# Patient Record
Sex: MALE | Race: WHITE | NOT HISPANIC OR LATINO | Employment: OTHER | URBAN - METROPOLITAN AREA
[De-identification: names, ages, dates, MRNs, and addresses within clinical notes are randomized per-mention and may not be internally consistent; named-entity substitution may affect disease eponyms.]

---

## 2017-01-27 ENCOUNTER — GENERIC CONVERSION - ENCOUNTER (OUTPATIENT)
Dept: OTHER | Facility: OTHER | Age: 82
End: 2017-01-27

## 2017-03-09 ENCOUNTER — ALLSCRIPTS OFFICE VISIT (OUTPATIENT)
Dept: OTHER | Facility: OTHER | Age: 82
End: 2017-03-09

## 2017-03-24 ENCOUNTER — GENERIC CONVERSION - ENCOUNTER (OUTPATIENT)
Dept: OTHER | Facility: OTHER | Age: 82
End: 2017-03-24

## 2017-04-07 ENCOUNTER — GENERIC CONVERSION - ENCOUNTER (OUTPATIENT)
Dept: OTHER | Facility: OTHER | Age: 82
End: 2017-04-07

## 2017-05-22 ENCOUNTER — GENERIC CONVERSION - ENCOUNTER (OUTPATIENT)
Dept: OTHER | Facility: OTHER | Age: 82
End: 2017-05-22

## 2017-06-13 ENCOUNTER — GENERIC CONVERSION - ENCOUNTER (OUTPATIENT)
Dept: OTHER | Facility: OTHER | Age: 82
End: 2017-06-13

## 2017-06-14 ENCOUNTER — GENERIC CONVERSION - ENCOUNTER (OUTPATIENT)
Dept: OTHER | Facility: OTHER | Age: 82
End: 2017-06-14

## 2017-07-24 ENCOUNTER — ALLSCRIPTS OFFICE VISIT (OUTPATIENT)
Dept: OTHER | Facility: OTHER | Age: 82
End: 2017-07-24

## 2017-07-24 LAB
BILIRUB UR QL STRIP: NEGATIVE
CLARITY UR: NORMAL
COLOR UR: YELLOW
GLUCOSE (HISTORICAL): NEGATIVE
HGB UR QL STRIP.AUTO: NORMAL
KETONES UR STRIP-MCNC: NEGATIVE MG/DL
LEUKOCYTE ESTERASE UR QL STRIP: NEGATIVE
NITRITE UR QL STRIP: NEGATIVE
PH UR STRIP.AUTO: 5.5 [PH]
PROT UR STRIP-MCNC: 100 MG/DL
SP GR UR STRIP.AUTO: 1.01
UROBILINOGEN UR QL STRIP.AUTO: 0.2

## 2017-09-14 ENCOUNTER — ALLSCRIPTS OFFICE VISIT (OUTPATIENT)
Dept: OTHER | Facility: OTHER | Age: 82
End: 2017-09-14

## 2017-09-29 ENCOUNTER — GENERIC CONVERSION - ENCOUNTER (OUTPATIENT)
Dept: OTHER | Facility: OTHER | Age: 82
End: 2017-09-29

## 2017-11-28 ENCOUNTER — GENERIC CONVERSION - ENCOUNTER (OUTPATIENT)
Dept: FAMILY MEDICINE CLINIC | Facility: CLINIC | Age: 82
End: 2017-11-28

## 2017-12-11 ENCOUNTER — GENERIC CONVERSION - ENCOUNTER (OUTPATIENT)
Dept: FAMILY MEDICINE CLINIC | Facility: CLINIC | Age: 82
End: 2017-12-11

## 2018-01-10 NOTE — PROGRESS NOTES
Assessment    1  Chronic kidney disease (585 9) (N18 9)   2  Hypothyroidism (244 9) (E03 9)   3  Atrial fibrillation (427 31) (I48 91)   4  Parkinson's disease (332 0) (G20)   5  Bipolar disorder (296 80) (F31 9)    Discussion/Summary    He is in today for his regular 6 mo, I had last seen him in September  He is complaining of decreased hearing left ear, I was able to remove wax from right but not left, I would like him to use Debrox wax softener in left ear for one or 2 days  his TSH in October was fine at 2 76, A1c 6 1 at that time  He did undergo CBC, CMP via neurology, Dr Sami Rodríguez, in January, this was stable with hemoglobin 10 2, creatinine 1 72  He will continue to see neurology, psychiatry and he will also be following up with his cardiologist   heart rate was initially elevated, dropped after sitting  Oxygen saturation 87% without increased shortness of breath  Runs lower blood pressures/ orthostatic in past - hold off on adding other meds  Fortunately he has had no falls, use walker as is  Recheck here 6 mo - call sooner as needed  Chief Complaint  Blocked ears / reg check      History of Present Illness  HPI: Mayank Jones is in today with his wife, notes blockage left ear  Otherwise is feeling about the same, ongoing tremors with depression for which she sees specialist, no increased chest pain or shortness of breath, sees cardiology every 6 months or so  No recent falls      Review of Systems    Constitutional: Appetite same, but as noted in HPI, no fever and no chills  ENT: Few recent minor nosebleeds  Cardiovascular: Occasional palpitations, but the heart rate was not slow, no chest pain, the heart rate was not fast and no lower extremity edema  Respiratory: No increased shortness of breath, but no cough, no orthopnea and no wheezing  Gastrointestinal: No change in bowel, but no abdominal pain, no nausea, no vomiting and no blood in stools  Genitourinary: no dysuria     Integumentary: Is seeing dermatology and needs removal superficial cancer, but no skin wound  Neurological: Sees neurology, no increase lightheadedness, no new weakness, but no headache and no fainting  Active Problems    1  Anemia (285 9) (D64 9)   2  Atrial fibrillation (427 31) (I48 91)   3  Benign prostatic hypertrophy (600 00) (N40 0)   4  Bipolar disorder (296 80) (F31 9)   5  History of CABG   6  Chronic kidney disease (585 9) (N18 9)   7  Chronic Obstructive Pulmonary Disease   8  Coronary arteriosclerosis (414 00) (I25 10)   9  Gastroesophageal reflux disease (530 81) (K21 9)   10  History of falling (V15 88) (Z91 81)   11  Hyperglycemia (790 29) (R73 9)   12  Hypothyroidism (244 9) (E03 9)   13  Memory Lapses Or Loss (780 93)   14  Muscle weakness of lower extremity (728 87) (M62 81)   15  Orthostatic hypotension (458 0) (I95 1)   16  Osteoarthritis (715 90) (M19 90)   17  Overactive bladder (596 51) (N32 81)   18  Parkinson's disease (332 0) (G20)   19  Restless Legs Syndrome    Past Medical History    1  History of Fracture of radius, proximal, right, closed (813 07) (S52 101A)   2  Gastroesophageal reflux disease (530 81) (K21 9)   3  History of laceration of skin (V15 59) (Z87 828)   4  History of Multiple skin tears (879 8) (T14 8)   5  History of Septicemia, Geriatric Presentation (038 8)  Active Problems And Past Medical History Reviewed: The active problems and past medical history were reviewed and updated today  Social History    · Former smoker (V15 82) (S40 267)   · Marital History - Currently    · Stopped Drinking Alcohol  The social history was reviewed and updated today  Surgical History    1  History of CABG   2  History of Hernia Repair   3  History of Knee Replacement   4  History of Tonsillectomy    Current Meds   1  Aspirin 81 MG TABS; TAKE 1 TABLET Mon/ Weds/Fri; Last Rx:12Mar2015 Ordered   2  Escitalopram Oxalate 10 MG Oral Tablet; take 2 tablet daily;    Therapy: 04CKX3154 to (Evaluate:62Pmo6834); Last Rx:12Mar2015 Ordered   3  LamoTRIgine 200 MG Oral Tablet; TAKE 1 TABLET DAILY; Therapy: (Recorded:06Jun2012) to Recorded   4  Levothyroxine Sodium 75 MCG Oral Tablet; Take 1 tablet daily; Therapy: 88JPN4463 to (Evaluate:73Yhb1206)  Requested for: 90Pqm2936; Last   Rx:12Sep2016 Ordered   5  LORazepam 0 5 MG Oral Tablet; TAKE 1 TAB THREE TIMES A DAY as need for anxiety; Last Rx:12Mar2015 Ordered   6  Pantoprazole Sodium 40 MG Oral Tablet Delayed Release; TAKE 1 TABLET BY MOUTH   EVERY DAY FOR STOMACH ACID; Therapy: 83SVZ4796 to (Eliana Sanabria)  Requested for: 08Aug2016; Last   Rx:08Aug2016 Ordered   7  Rivastigmine Tartrate 3 MG Oral Capsule; TAKE 1 CAPSULE TWICE DAILY; Therapy: 59MPZ5405 to (Evaluate:17Mar2016); Last Rx:16Mar2016 Ordered   8  Simvastatin 20 MG Oral Tablet; 1 qd; Therapy: (Recorded:06Jun2012) to Recorded   9  Sinemet  MG Oral Tablet; TAKE 1 TABLET 4 TIMES DAILY; Therapy: (Recorded:08Jun2012) to Recorded   10  Tamsulosin HCl - 0 4 MG Oral Capsule; take 1 capsule by mouth daily; Therapy: 71Tzj7471 to (Evaluate:02Apr2017)  Requested for: 04Oct2016; Last    Rx:04Oct2016 Ordered   11  Toviaz 4 MG Oral Tablet Extended Release 24 Hour; Take 1 tablet daily; Therapy: (Recorded:16Mar2016) to Recorded   12  Vitamin D 1000 UNIT CAPS; take 1 capsule daily; Therapy: (Recorded:16Mar2016) to Recorded    The medication list was reviewed and updated today  Allergies    1  Colchicine Powder    Vitals   Recorded: 70HQS7042 10:22AM Recorded: 24DVN0399 10:15AM   Temperature  97 7 F   Heart Rate 88 102   Pulse Quality Regular    Systolic  685   Diastolic  62   Height  5 ft 8 in   Weight  133 lb    BMI Calculated  20 22   BSA Calculated  1 72   O2 Saturation  87, RA     Physical Exam    Constitutional More animated here today, pleasant very tremulous male arises from chair and seat self on table with minor cyst  Appears is at baseline     Eyes   Conjunctiva and lids: No swelling, erythema, or discharge  No pallor or icterus  Ears, Nose, Mouth, and Throat Wax occluding left canal, unable to remove  Pulmonary   Auscultation of lungs: Clear to auscultation, equal breath sounds bilaterally, no wheezes, no rales, no rhonci  Cardiovascular Irregular, irregular a controlled rate  No ankle edema  Abdomen   Abdomen: Non-tender, no masses  Lymphatic   Palpation of lymph nodes in neck: No lymphadenopathy  Neurologic Alert and oriented Ã3, moderately severe tremor at rest head, limits     Psychiatric   Orientation to person, place and time: Normal          Future Appointments    Date/Time Provider Specialty Site   09/14/2017 10:00 AM Mitch Grady DO Family Medicine [address] [facility]     Signatures   Electronically signed by : Mitch Giles DO; Mar  9 2017 12:38PM EST                       (Author)

## 2018-01-10 NOTE — MISCELLANEOUS
Provider Comments  Provider Comments:   Patient no showed appt on 9/14/17      Signatures   Electronically signed by : Yayo Finch, ; Sep 14 2017 11:06AM EST                       (Author)

## 2018-01-13 VITALS
SYSTOLIC BLOOD PRESSURE: 116 MMHG | OXYGEN SATURATION: 87 % | HEIGHT: 68 IN | TEMPERATURE: 97.7 F | WEIGHT: 133 LBS | DIASTOLIC BLOOD PRESSURE: 62 MMHG | HEART RATE: 88 BPM | BODY MASS INDEX: 20.16 KG/M2

## 2018-01-14 VITALS
BODY MASS INDEX: 21.05 KG/M2 | HEIGHT: 66 IN | DIASTOLIC BLOOD PRESSURE: 74 MMHG | WEIGHT: 131 LBS | SYSTOLIC BLOOD PRESSURE: 122 MMHG

## 2018-01-15 NOTE — MISCELLANEOUS
Message   Recorded as Task   Date: 09/29/2017 10:08 AM, Created By: Hyacinth Funez   Task Name: Call Back   Assigned To: Tucker ENRIQUE,TEAM   Regarding Patient: Thompson Jovel, Status: Active   Comment:    Alexsandra Solitario - 29 Sep 2017 10:08 AM     TASK CREATED  Caller: Self; [phone] (Home)  Patients wife Yolanda Patel calling in regard to his medication  The new medication is not working well for him and he would like to go back on [place]  Please call her   ElaineLatasha - 29 Sep 2017 11:38 AM     TASK EDITED  SPOKE TO WIFE, PT REQUESTING TOVIAZ 4MG ONE MONTH SUPPLY TO BE SENT TO PHARM  WILL DIRECT TO DR Melanie Ortiz FOR ORDER  PER DR SKELTON REORDER TOVIAZ 4MG DAILY  [address] TO PHARM WIFE NOTIFIED  1        1 Amended By: Larry Mota; Sep 29 2017 2:51 PM EST    Active Problems   1  Anemia (285 9) (D64 9)  2  Atrial fibrillation (427 31) (I48 91)  3  Benign prostatic hypertrophy (600 00) (N40 0)  4  Bipolar disorder (296 80) (F31 9)  5  History of CABG  6  Chronic kidney disease (585 9) (N18 9)  7  Chronic Obstructive Pulmonary Disease  8  Coronary arteriosclerosis (414 00) (I25 10)  9  Gastroesophageal reflux disease (530 81) (K21 9)  10  History of falling (V15 88) (Z91 81)  11  Hyperglycemia (790 29) (R73 9)  12  Hypothyroidism (244 9) (E03 9)  13  Memory Lapses Or Loss (780 93)  14  Muscle weakness of lower extremity (728 87) (M62 81)  15  Orthostatic hypotension (458 0) (I95 1)  16  Overactive bladder (596 51) (N32 81)  17  Parkinson's disease (332 0) (G20)  18  Restless Legs Syndrome    Current Meds  1  Aspirin 81 MG TABS; TAKE 1 TABLET Mon/ Weds/Fri; Last Rx:12Mar2015 Ordered  2  Escitalopram Oxalate 10 MG Oral Tablet; take 2 tablet daily; Therapy: 46PTI3958 to (Evaluate:60Icm2238); Last Rx:12Mar2015 Ordered  3  LamoTRIgine 200 MG Oral Tablet; TAKE 1 TABLET DAILY; Therapy: (Recorded:06Jun2012) to Recorded  4   Levothyroxine Sodium 75 MCG Oral Tablet; take 1 tablet by mouth daily; Therapy: 16YGS5515 to (Evaluate:20Oct2017)  Requested for: 20OYW0561; Last   Rx:20Sep2017 Ordered  5  LORazepam 0 5 MG Oral Tablet; TAKE 1 TAB THREE TIMES A DAY as need for anxiety; Last Rx:12Mar2015 Ordered  6  Pantoprazole Sodium 40 MG Oral Tablet Delayed Release; TAKE 1 TABLET BY MOUTH   EVERY DAY FOR STOMACH ACID; Therapy: 14RON5611 to (Evaluate:02Lum2666)  Requested for: 68OEJ5136; Last   Rx:53Kty9983 Ordered  7  Rivastigmine Tartrate 3 MG Oral Capsule (Exelon); TAKE 1 CAPSULE TWICE DAILY; Therapy: 55IOQ9073 to (Evaluate:17Mar2016); Last Rx:16Mar2016 Ordered  8  Simvastatin 20 MG Oral Tablet; 1 qd; Therapy: (Recorded:06Jun2012) to Recorded  9  Sinemet  MG Oral Tablet (Carbidopa-Levodopa); TAKE 1 TABLET 4 TIMES   DAILY; Therapy: (Recorded:08Jun2012) to Recorded  10  Tolterodine Tartrate ER 4 MG Oral Capsule Extended Release 24 Hour; take 1 capsule    daily; Therapy: 27XRF0914 to (21 )  Requested for: 17MFB4865; Last    Rx:67Edb1708 Ordered  11  Vitamin D 1000 UNIT CAPS; take 1 capsule daily; Therapy: (Recorded:16Mar2016) to Recorded    Allergies   1   Colchicine Powder    Signatures   Electronically signed by : Katharina Ji RN; Sep 29 2017  2:51PM EST                       (Author)

## 2018-01-16 NOTE — MISCELLANEOUS
Message   Recorded as Task   Date: 05/20/2016 11:17 AM, Created By: Emiliano Mcgraw   Task Name: Medical Complaint Callback   Assigned To: Missael Rodrigues   Regarding Patient: Hubert Li, Status: Active   Comment:    Emiliano Mcgraw - 20 May 2016 11:17 AM     TASK CREATED  Caller: Self; Medical Complaint; [phone] (Home)  Pt called and l/m stating that he has been having nose bleeds off and on for last 2 weeks and now nose is bleeding again  Pt then called office and instructed him to go to ED now for an eval and cauterization of bleed     Barbara Pantoja - 20 May 2016 11:17 AM     TASK REASSIGNED: Previously Assigned To Antolin Oropeza - 20 May 2016 11:33 AM     TASK REPLIED TO: Previously Assigned To Missael Rodrigues  noted        Signatures   Electronically signed by : Harvey Aguila DO; May 20 2016 11:33AM EST                       (Author)

## 2018-02-06 ENCOUNTER — OFFICE VISIT (OUTPATIENT)
Dept: UROLOGY | Facility: MEDICAL CENTER | Age: 83
End: 2018-02-06
Payer: MEDICARE

## 2018-02-06 VITALS
DIASTOLIC BLOOD PRESSURE: 74 MMHG | HEIGHT: 66 IN | WEIGHT: 133.6 LBS | BODY MASS INDEX: 21.47 KG/M2 | SYSTOLIC BLOOD PRESSURE: 116 MMHG

## 2018-02-06 DIAGNOSIS — N40.1 BENIGN PROSTATIC HYPERPLASIA (BPH) WITH URINARY URGENCY: Primary | ICD-10-CM

## 2018-02-06 DIAGNOSIS — R39.15 BENIGN PROSTATIC HYPERPLASIA (BPH) WITH URINARY URGENCY: Primary | ICD-10-CM

## 2018-02-06 LAB
SL AMB  POCT GLUCOSE, UA: NEGATIVE
SL AMB LEUKOCYTE ESTERASE,UA: NEGATIVE
SL AMB POCT BILIRUBIN,UA: NEGATIVE
SL AMB POCT BLOOD,UA: ABNORMAL
SL AMB POCT CLARITY,UA: CLEAR
SL AMB POCT COLOR,UA: YELLOW
SL AMB POCT KETONES,UA: NEGATIVE
SL AMB POCT NITRITE,UA: NEGATIVE
SL AMB POCT PH,UA: 5
SL AMB POCT SPECIFIC GRAVITY,UA: 1.01
SL AMB POCT URINE PROTEIN: ABNORMAL
SL AMB POCT UROBILINOGEN: 0.2

## 2018-02-06 PROCEDURE — 81003 URINALYSIS AUTO W/O SCOPE: CPT | Performed by: UROLOGY

## 2018-02-06 PROCEDURE — 99214 OFFICE O/P EST MOD 30 MIN: CPT | Performed by: UROLOGY

## 2018-02-06 RX ORDER — ESCITALOPRAM OXALATE 10 MG/1
2 TABLET ORAL DAILY
COMMUNITY
Start: 2014-07-10

## 2018-02-06 RX ORDER — PANTOPRAZOLE SODIUM 40 MG/1
1 TABLET, DELAYED RELEASE ORAL EVERY MORNING
COMMUNITY
Start: 2013-05-21 | End: 2018-07-13 | Stop reason: SDUPTHER

## 2018-02-06 RX ORDER — FESOTERODINE FUMARATE 4 MG/1
4 TABLET, EXTENDED RELEASE ORAL DAILY
Qty: 90 TABLET | Refills: 3 | Status: SHIPPED | OUTPATIENT
Start: 2018-02-06 | End: 2018-10-29 | Stop reason: SDUPTHER

## 2018-02-06 NOTE — PROGRESS NOTES
Assessment/Plan:    Assessment:  1  Prostatic hyperplasia without obstruction based on previous cystoscopy  2  Overactive bladder due to  3  Parkinson's disease  4  Family counseling    Plan:  1  Continue Toviaz 4 mg daily  2   Return in 1 year  No problem-specific Assessment & Plan notes found for this encounter  Diagnoses and all orders for this visit:    Benign prostatic hyperplasia (BPH) with urinary urgency  -     POCT urine dip auto non-scope    Other orders  -     pantoprazole (PROTONIX) 40 mg tablet; Take by mouth  -     MULTIPLE VITAMIN PO; Take 1 tablet by mouth  -     escitalopram (LEXAPRO) 10 mg tablet; Take 2 tablets by mouth daily          Subjective:      Patient ID: Dayday Brown is a 80 y o  male  The patient is accompanied by his wife  HPI        BPH:  The patient notes no change in his voiding symptoms, however his AUA score is up about 6 points  Quality of life is unchanged  The patient denies hematuria, bleeding or dysuria  Most of his urinary complaints are related to his overactive bladder and Parkinson's disease  A previous cystoscopy showed no prostatic obstruction and a minimal postvoid residual, indications at the problem is related to the bladder not the prostate  OAB due to Parkinson's:        Toviaz 4 mg seems to work the best for him  He has a rare accidents at night  Ordinarily, he wakes to use a bedside commode  Prior to [address] these were common place  Trials of Myrbetriq and oxybutynin proved ineffective  His Parkinson's disease in general has been getting worse for a long time  He notes problems with memory now also  The following portions of the patient's history were reviewed and updated as appropriate: allergies, current medications, past family history, past medical history, past social history, past surgical history and problem list     Review of Systems   Constitutional: Negative for activity change and fatigue     Respiratory: Negative for shortness of breath and wheezing  Cardiovascular: Negative for chest pain  Gastrointestinal: Negative for abdominal pain  Genitourinary: Negative for difficulty urinating, dysuria, frequency, hematuria and urgency  Musculoskeletal: Negative for back pain and gait problem  Skin: Negative  Allergic/Immunologic: Negative  Neurological: Negative  Psychiatric/Behavioral: Negative  Objective:     Physical Exam   Constitutional: He is oriented to person, place, and time  He appears well-developed and well-nourished  HENT:   Head: Normocephalic and atraumatic  Neck: Normal range of motion  Neck supple  Pulmonary/Chest: Effort normal    Genitourinary:   Genitourinary Comments: On digital rectal exam, the prostate is approximately 30-40 g  It is nontender  Anatomical landmarks are well defined and the texture is normal   Anal sphincter tone is normal    Musculoskeletal: Normal range of motion  Neurological: He is alert and oriented to person, place, and time  He has normal reflexes  Skin: Skin is warm and dry  Psychiatric: He has a normal mood and affect   His behavior is normal  Judgment and thought content normal

## 2018-02-06 NOTE — PATIENT INSTRUCTIONS
Overactive Bladder   AMBULATORY CARE:   Overactive bladder  is a sudden urge to urinate that is difficult for you to control  It occurs when the muscles of the bladder contract (tighten) more than normal  This causes a frequent or sudden need to urinate  You usually have to urinate more than 8 times in 24 hours  You may need to get up more than once in the middle of the night to urinate  You may also leak urine before you are able to make it to the bathroom  Contact your healthcare provider for any of the following:   · Pink or bloody urine    · Painful urination    · Continued symptoms even after treatment    · Questions or concerns about your condition or care  Manage your symptoms:   · Limit liquids as directed  Limit liquids to decrease the amount you urinate  Ask how much liquid to drink each day and which liquids are best for you  You may need to avoid drinking liquids several hours before you go to sleep  Your healthcare provider may also recommend that you limit caffeine and alcohol  · Exercise regularly and maintain a healthy weight  Ask your healthcare provider how much you should weigh and about the best exercise plan for you  Extra weight puts pressure on your bladder and may make your symptoms worse  Ask him to help you create a weight loss plan if you are overweight  · Do pelvic muscle exercises often  Your pelvic muscles help you stop urinating  Squeeze these muscles tightly for 5 seconds, then relax for 5 seconds  Gradually work up to squeezing for 10 seconds  Do 3 sets of 15 repetitions a day, or as directed  This will help strengthen your pelvic muscles and improve bladder control  · Train your bladder  Go to the bathroom at set times, such as every 2 hours, even if you do not feel the urge to go  You can also try to hold your urine when you feel the urge to go  For example, hold your urine for 5 minutes when you feel the urge to go   As that becomes easier, hold your urine for 10 minutes  Work up to every 3 or 4 hours to help control your bladder  Treatment for overactive bladder  may be needed if other methods are not working:  · Medicines  may be given to relax your bladder and decrease urination  · Sacral nerve stimulation  sends electrical signals to your sacral nerve through a small device implanted under your skin  Your sacral nerve controls your bladder, sphincter, and pelvic floor muscles  · Surgery  may be done if all other treatments cannot help you control your bladder  Follow up with your healthcare provider as directed:  Write down your questions so you remember to ask them during your visits  © 2017 [facility] Information is for End User's use only and may not be sold, redistributed or otherwise used for commercial purposes  All illustrations and images included in CareNotes® are the copyrighted property of [employer]  or [employer]  The above information is an  only  It is not intended as medical advice for individual conditions or treatments  Talk to your doctor, nurse or pharmacist before following any medical regimen to see if it is safe and effective for you

## 2018-02-06 NOTE — LETTER
February 6, 2018     Elise Pozo DO  [address] [address] [facility]     Patient: Michelle Lees   YOB: 1930   Date of Visit: 2/6/2018       Dear Dr Micheline Winston: Thank you for referring Mary Giang to me for evaluation  Below are my notes for this consultation  If you have questions, please do not hesitate to call me  I look forward to following your patient along with you  Sincerely,        Estrellita Mcgowan MD        CC: No Recipients  Estrellita Mcgowan MD  2/6/2018  2:09 PM  Sign at close encounter  Assessment/Plan:    No problem-specific Assessment & Plan notes found for this encounter  Diagnoses and all orders for this visit:    Benign prostatic hyperplasia (BPH) with urinary urgency  -     POCT urine dip auto non-scope    Other orders  -     pantoprazole (PROTONIX) 40 mg tablet; Take by mouth  -     MULTIPLE VITAMIN PO; Take 1 tablet by mouth  -     escitalopram (LEXAPRO) 10 mg tablet; Take 2 tablets by mouth daily          Subjective:      Patient ID: Michelle Lees is a 80 y o  male  HPI        BPH:  The patient notes no change in his voiding symptoms, however his AUA score is up about 6 points  Quality of life is unchanged  The patient denies hematuria, bleeding or dysuria  Most of his urinary complaints are related to his overactive bladder and Parkinson's disease  A previous cystoscopy showed no prostatic obstruction and a minimal postvoid residual, indications at the problem is related to the bladder not the prostate  OAB due to Parkinson's:        Toviaz 4 mg seems to work the best for him  He has a rare accidents at night  Ordinarily, he wakes to use a bedside commode  Prior to [employer] these were common place  Trials of Myrbetriq and oxybutynin proved ineffective  His Parkinson's disease in general has been getting worse for a long time  He notes problems with memory now also      The following portions of the patient's history were reviewed and updated as appropriate: allergies, current medications, past family history, past medical history, past social history, past surgical history and problem list     Review of Systems   Constitutional: Negative for activity change and fatigue  Respiratory: Negative for shortness of breath and wheezing  Cardiovascular: Negative for chest pain  Gastrointestinal: Negative for abdominal pain  Genitourinary: Negative for difficulty urinating, dysuria, frequency, hematuria and urgency  Musculoskeletal: Negative for back pain and gait problem  Skin: Negative  Allergic/Immunologic: Negative  Neurological: Negative  Psychiatric/Behavioral: Negative  Objective:     Physical Exam   Constitutional: He is oriented to person, place, and time  He appears well-developed and well-nourished  HENT:   Head: Normocephalic and atraumatic  Neck: Normal range of motion  Neck supple  Pulmonary/Chest: Effort normal    Genitourinary:   Genitourinary Comments: On digital rectal exam, the prostate is approximately 30-40 g  It is nontender  Anatomical landmarks are well defined and the texture is normal   Anal sphincter tone is normal    Musculoskeletal: Normal range of motion  Neurological: He is alert and oriented to person, place, and time  He has normal reflexes  Skin: Skin is warm and dry  Psychiatric: He has a normal mood and affect   His behavior is normal  Judgment and thought content normal

## 2018-02-06 NOTE — PROGRESS NOTES
IPSS Questionnaire (AUA-7): Over the past month    1)  How often have you had a sensation of not emptying your bladder completely after you finish urinating? 2 - Less than half the time   2)  How often have you had to urinate again less than two hours after you finished urinating? 5 - Almost always   3)  How often have you found you stopped and started again several times when you urinated? 4 - More than half the time   4) How difficult have you found it to postpone urination? 3 - About half the time   5) How often have you had a weak urinary stream?  5 - Almost always   6) How often have you had to push or strain to begin urination? 3 - About half the time   7) How many times did you most typically get up to urinate from the time you went to bed until the time you got up in the morning?   2 - 2 times   Total Score:  24

## 2018-02-15 DIAGNOSIS — N40.1 BENIGN LOCALIZED HYPERPLASIA OF PROSTATE WITH URINARY OBSTRUCTION AND LOWER URINARY TRACT SYMPTOMS: Primary | ICD-10-CM

## 2018-02-15 DIAGNOSIS — N13.9 BENIGN LOCALIZED HYPERPLASIA OF PROSTATE WITH URINARY OBSTRUCTION AND LOWER URINARY TRACT SYMPTOMS: Primary | ICD-10-CM

## 2018-02-16 RX ORDER — TAMSULOSIN HYDROCHLORIDE 0.4 MG/1
CAPSULE ORAL
Qty: 90 CAPSULE | Refills: 0 | OUTPATIENT
Start: 2018-02-16

## 2018-02-19 RX ORDER — TAMSULOSIN HYDROCHLORIDE 0.4 MG/1
0.4 CAPSULE ORAL
Qty: 90 CAPSULE | Refills: 3 | Status: SHIPPED | OUTPATIENT
Start: 2018-02-19 | End: 2018-04-03 | Stop reason: ALTCHOICE

## 2018-03-27 ENCOUNTER — TELEPHONE (OUTPATIENT)
Dept: FAMILY MEDICINE CLINIC | Facility: CLINIC | Age: 83
End: 2018-03-27

## 2018-03-27 NOTE — TELEPHONE ENCOUNTER
Pt's wife called stating pt had a nose bleed that started around 8am and they have not been able to stop the bleeding  From the description, it sounds that it is slowing down but just will not stop completely  Pt has had a nose bleed before that required cauterization  After speaking with Dr Stuart Haque, I contacted [facility] (has seen pt for epistaxis in the past) and they are able to get him in for an appt this afternoon  Pt notified  Will contact pt tomorrow to see how he is doing and to schedule appt in the office as he missed his last appt

## 2018-03-29 NOTE — TELEPHONE ENCOUNTER
I called pt to check on him - he did go to appt at [facility] on Tuesday and they ended up cauterizing  Pt has not had any problems since  Pt does want to schedule appt but between the two of them they have a lot of appts in the next couple weeks and asked if they can call back to schedule

## 2018-04-03 ENCOUNTER — OFFICE VISIT (OUTPATIENT)
Dept: FAMILY MEDICINE CLINIC | Facility: CLINIC | Age: 83
End: 2018-04-03
Payer: MEDICARE

## 2018-04-03 VITALS
SYSTOLIC BLOOD PRESSURE: 168 MMHG | WEIGHT: 131.2 LBS | HEIGHT: 66 IN | DIASTOLIC BLOOD PRESSURE: 64 MMHG | BODY MASS INDEX: 21.08 KG/M2 | TEMPERATURE: 96.5 F

## 2018-04-03 DIAGNOSIS — G20 PARKINSON'S DISEASE (HCC): ICD-10-CM

## 2018-04-03 DIAGNOSIS — E03.9 ACQUIRED HYPOTHYROIDISM: ICD-10-CM

## 2018-04-03 DIAGNOSIS — S16.1XXA ACUTE STRAIN OF NECK MUSCLE, INITIAL ENCOUNTER: Primary | ICD-10-CM

## 2018-04-03 DIAGNOSIS — R26.9 GAIT ABNORMALITY: ICD-10-CM

## 2018-04-03 DIAGNOSIS — D64.9 ANEMIA, UNSPECIFIED TYPE: ICD-10-CM

## 2018-04-03 DIAGNOSIS — N18.30 CHRONIC KIDNEY DISEASE, STAGE III (MODERATE) (HCC): ICD-10-CM

## 2018-04-03 PROBLEM — R04.0 EPISTAXIS: Status: ACTIVE | Noted: 2018-04-03

## 2018-04-03 PROCEDURE — 99214 OFFICE O/P EST MOD 30 MIN: CPT | Performed by: FAMILY MEDICINE

## 2018-04-03 RX ORDER — LEVOTHYROXINE SODIUM 0.07 MG/1
1 TABLET ORAL DAILY
Refills: 3 | COMMUNITY
Start: 2018-03-19

## 2018-04-03 RX ORDER — ASPIRIN 81 MG/1
81 TABLET ORAL DAILY
COMMUNITY

## 2018-04-03 RX ORDER — LORAZEPAM 0.5 MG/1
0.5 TABLET ORAL DAILY PRN
Qty: 30 TABLET | Refills: 0
Start: 2018-04-03 | End: 2018-06-26

## 2018-04-03 RX ORDER — LAMOTRIGINE 200 MG/1
200 TABLET, ORALLY DISINTEGRATING ORAL EVERY MORNING
Refills: 0
Start: 2018-04-03 | End: 2018-05-21

## 2018-04-03 NOTE — PATIENT INSTRUCTIONS
He has acute cervical strain / upper thoracic strain associated with a fall in his home the other day, we do need to be very cautious with any medication, he can use Aleve 1 daily x3 days, he does have chronic kidney disease, last creatinine in December at 1 92  Hemoglobin 10 3  He also can use Tylenol as needed  Use heat or ice as needed  Consider therapy  He had seen Nephrology last yr, I gave slip to redo blood work  Also include TSH as he has not had a TSH level in over 1 year  He will continue to see Cardiology as is, he had seen them back in December-  Is not on anticoagulation due to frequent falls, also had another episode of epistaxis recently which required evaluation at ENT  Has excess wax left ear canal, use Debrox for a few days, return for flush if needed  continues to see Neurology, Dr Alex Davies, I updated med list here today    Also continues to see Psychiatry, Dr Patsy Hagan

## 2018-04-03 NOTE — PROGRESS NOTES
FAMILY PRACTICE OFFICE VISIT  Jorge WOODY  [address] [facility]  [address], [facility]      NAME: John Yañez  AGE: 80 y o  SEX: male  : 1930   MRN: [id number]    DATE: 4/3/2018  TIME: 5:52 PM    Assessment and Plan     Problem List Items Addressed This Visit        Endocrine    Hypothyroidism    Relevant Medications    levothyroxine 75 mcg tablet    Other Relevant Orders    TSH, 3rd generation       Nervous and Auditory    Parkinson's disease ([address] )    Relevant Medications    LORazepam (ATIVAN) 0 5 mg tablet    lamoTRIgine (LaMICtal) 200 MG TBDP       Genitourinary    Chronic kidney disease, stage III (moderate)    Relevant Orders    Basic metabolic panel       Other    Anemia    Relevant Orders    CBC    Gait abnormality      Other Visit Diagnoses     Acute strain of neck muscle, initial encounter    -  Primary    Relevant Orders    XR spine cervical 2 or 3 vw injury    XR spine thoracic 3 vw          Patient Instructions     He has acute cervical strain / upper thoracic strain associated with a fall in his home the other day, we do need to be very cautious with any medication, he can use Aleve 1 daily x3 days, he does have chronic kidney disease, last creatinine in December at 1 92  Hemoglobin 10 3  He also can use Tylenol as needed  Use heat or ice as needed  Consider therapy  He had seen Nephrology last yr, I gave slip to redo blood work  Also include TSH as he has not had a TSH level in over 1 year  He will continue to see Cardiology as is, he had seen them back in December-  Is not on anticoagulation due to frequent falls, also had another episode of epistaxis recently which required evaluation at ENT  Has excess wax left ear canal, use Debrox for a few days, return for flush if needed  continues to see Neurology, Dr Veronica Maldonado, I updated med list here today    Also continues to see Psychiatry,   Gross      Chief Complaint     Chief Complaint   Patient presents with    Fall     4/2/18 in am - pain in neck and down left side of his back        History of Present Illness   John Yañez is a 80y o -year-old male who he is in today accompanied by his wife, I have not seen him since March of last year  The other day at home he had lost his balance and fell into [place] cabinet, contusion with laceration left arm, also struck head, no loss of consciousness, awoke today with significant bilateral posterior neck pain, nonradicular  No change in mental status  As before has significant parkinsonism, frequent falls, continues to see Neurology, Psychiatry  Also sees Cardiology, no recent chest pain or other issues  He is using medication as directed  Regarding atrial fibrillation history he is not anticoagulated due to frequent falls along with occasional nose bleeds, he did see ENT recently with nose bleed  He also sees Urology, had seen Nephrology via [facility] last year      Review of Systems   Review of Systems   Constitutional: Positive for fatigue (No increased fatigue)  Negative for activity change (Sedentary, uses walker to ambulate), appetite change and unexpected weight change  HENT: Negative for congestion, mouth sores and sore throat  Respiratory: Negative for cough, choking, chest tightness and shortness of breath  Cardiovascular: Negative for chest pain, palpitations and leg swelling  Gastrointestinal: Negative for abdominal pain, blood in stool, nausea and vomiting  Genitourinary: Negative for dysuria and hematuria  Musculoskeletal: Positive for arthralgias, back pain, gait problem, neck pain and neck stiffness  Skin: Positive for wound (Chronic significant bruising on arms as before)  Neurological: Positive for light-headedness and headaches  Negative for syncope  Hematological: Bruises/bleeds easily     Psychiatric/Behavioral: Positive for behavioral problems (Depression has been stable)  Active Problem List     Patient Active Problem List   Diagnosis    Anemia    Nonrheumatic aortic valve stenosis    Atrial fibrillation (HCC)    Benign prostatic hyperplasia    Bipolar disorder ([address] )    Carotid disease, bilateral ([address] )    Chronic kidney disease, stage III (moderate)    Other emphysema (HCC)    Coronary arteriosclerosis    Benign essential hypertension    Gastroesophageal reflux disease    Hyperglycemia    Hyperlipidemia    Hypothyroidism    Memory loss    Muscle weakness of lower extremity    Orthostatic hypotension    Overactive bladder    Parkinson's disease ([address] )    Restless legs syndrome    Gait abnormality    Epistaxis       Past Medical History:  Past Medical History:   Diagnosis Date    Arthritis     BPH (benign prostatic hyperplasia)     Cardiac disease     Depression     External hemorrhoids     Frequent urination     Gout     Heart disease     Hx of CABG     Hyperlipidemia     Nocturia     Parkinson disease (HCC)     SOB (shortness of breath)     Squamous cell carcinoma     Staph infection     Urgency of urination        Past Surgical History:  Past Surgical History:   Procedure Laterality Date    CORONARY ARTERY BYPASS GRAFT      FOOT SURGERY Left 2006    REPLACEMENT TOTAL KNEE BILATERAL      L- 2003, R- 2006       Family History:  Family History   Problem Relation Age of Onset    Lung cancer Mother     Heart disease Father        Social History:  Social History     Social History    Marital status: /Civil Union     Spouse name: N/A    Number of children: N/A    Years of education: N/A     Occupational History    Not on file       Social History Main Topics    Smoking status: Former Smoker     Types: Cigarettes     Quit date: 1972    Smokeless tobacco: Never Used    Alcohol use Yes    Drug use: No    Sexual activity: Not on file     Other Topics Concern    Not on file     Social History Narrative    No narrative on file       Objective     Vitals:    04/03/18 1438   BP: 168/64   BP Location: Left arm   Patient Position: Sitting   Cuff Size: Standard   Temp: (!) 96 5 °F (35 8 °C)   Weight: 59 5 kg (131 lb 3 2 oz)   Height: 5' 6" (1 676 m)     Body mass index is 21 18 kg/m²  BP Readings from Last 3 Encounters:   04/03/18 168/64   02/06/18 116/74   07/24/17 122/74       Wt Readings from Last 3 Encounters:   04/03/18 59 5 kg (131 lb 3 2 oz)   02/06/18 60 6 kg (133 lb 9 6 oz)   07/24/17 59 4 kg (131 lb)       Physical Exam   Constitutional:   Pleasant 80year-old seated in chair, parkinsonian features but conversant, no acute distress  HENT:   Left ear with moderate amount nonocclusive wax   Eyes: Conjunctivae are normal  No scleral icterus  Cardiovascular:   Irregularly irregular at controlled rate   Pulmonary/Chest: Effort normal and breath sounds normal  No respiratory distress  Abdominal: There is no tenderness  Musculoskeletal:   Slight trace pitting bilateral ankles    Has bandage left elbow, I did not remove that, friable skin on arms, multiple bruises as usual on arms  Significant tenderness bilateral posterior neck/cervical, no compressive symptoms, no point tenderness vertebral, moderate decreased range of motion  Shoulder range of motion appears adequate  He is alert, oriented, appears as at baseline regarding mental status  Lymphadenopathy:     He has no cervical adenopathy  Psychiatric: He has a normal mood and affect         Pertinent Laboratory/Diagnostic Studies:    Lab Results   Component Value Date    WBC 6 22 01/15/2015    HGB 10 8 (L) 01/15/2015    HCT 33 5 (L) 01/15/2015     (H) 01/15/2015    CHOL 118 06/14/2014    TRIG 104 06/14/2014    HDL 34 06/14/2014    ALT 13 01/16/2015    AST 19 01/16/2015     01/17/2015    K 4 2 01/17/2015     01/17/2015    CREATININE 1 16 01/17/2015    BUN 22 01/17/2015    CO2 25 01/17/2015    INR 1 10 01/15/2015 Lab Results   Component Value Date    LDLCALC 63 06/14/2014     No results found for: TSH    ALLERGIES:  Allergies   Allergen Reactions    Colchicine      Reaction Date: [id number];        Current Medications     Current Outpatient Prescriptions   Medication Sig Dispense Refill    aspirin (ECOTRIN LOW STRENGTH) 81 mg EC tablet Take 81 mg by mouth daily      carbidopa-levodopa (SINEMET)  mg per tablet Take 1 tablet by mouth 4 (four) times a day       Cholecalciferol (VITAMIN D PO) Take 1,000 mg by mouth daily with lunch   escitalopram (LEXAPRO) 10 mg tablet Take 2 tablets by mouth daily      Fesoterodine Fumarate ER 4 MG TB24 Take 1 tablet (4 mg total) by mouth daily 90 tablet 3    lamoTRIgine (LaMICtal) 200 MG TBDP Take 1 tablet (200 mg total) by mouth every morning  0    levothyroxine 75 mcg tablet 1 tablet daily  3    MULTIPLE VITAMIN PO Take 1 tablet by mouth      pantoprazole (PROTONIX) 40 mg tablet Take 1 tablet by mouth every morning       rivastigmine (EXELON) 3 mg capsule Take 3 mg by mouth 2 (two) times a day   simvastatin (ZOCOR) 20 mg tablet Take 20 mg by mouth daily with dinner   LORazepam (ATIVAN) 0 5 mg tablet Take 1 tablet (0 5 mg total) by mouth daily as needed for anxiety 30 tablet 0     No current facility-administered medications for this visit            Health Maintenance     Orders Placed This Encounter   Procedures    XR spine cervical 2 or 3 vw injury    XR spine thoracic 3 vw    TSH, 3rd generation    Basic metabolic panel    CBC         Taqueria Paz DO

## 2018-05-21 ENCOUNTER — OFFICE VISIT (OUTPATIENT)
Dept: FAMILY MEDICINE CLINIC | Facility: CLINIC | Age: 83
End: 2018-05-21
Payer: MEDICARE

## 2018-05-21 VITALS
HEIGHT: 66 IN | SYSTOLIC BLOOD PRESSURE: 150 MMHG | RESPIRATION RATE: 20 BRPM | TEMPERATURE: 97 F | WEIGHT: 128.6 LBS | DIASTOLIC BLOOD PRESSURE: 78 MMHG | HEART RATE: 72 BPM | BODY MASS INDEX: 20.67 KG/M2

## 2018-05-21 DIAGNOSIS — M20.42 HAMMER TOE OF LEFT FOOT: ICD-10-CM

## 2018-05-21 DIAGNOSIS — L03.032 CELLULITIS OF FOURTH TOE, LEFT: Primary | ICD-10-CM

## 2018-05-21 DIAGNOSIS — L60.8 TOENAIL DEFORMITY: ICD-10-CM

## 2018-05-21 PROCEDURE — 99214 OFFICE O/P EST MOD 30 MIN: CPT | Performed by: FAMILY MEDICINE

## 2018-05-21 RX ORDER — ESCITALOPRAM OXALATE 20 MG/1
TABLET ORAL
Refills: 3 | COMMUNITY
Start: 2018-04-09 | End: 2018-05-21

## 2018-05-21 RX ORDER — LAMOTRIGINE 200 MG/1
TABLET ORAL
Refills: 2 | COMMUNITY
Start: 2018-04-24

## 2018-05-21 RX ORDER — TAMSULOSIN HYDROCHLORIDE 0.4 MG/1
CAPSULE ORAL
Refills: 3 | COMMUNITY
Start: 2018-05-18

## 2018-05-21 RX ORDER — CEPHALEXIN 500 MG/1
500 CAPSULE ORAL EVERY 6 HOURS SCHEDULED
Qty: 28 CAPSULE | Refills: 0 | Status: SHIPPED | OUTPATIENT
Start: 2018-05-21 | End: 2018-05-28

## 2018-05-21 NOTE — PROGRESS NOTES
Assessment/Plan:         Diagnoses and all orders for this visit:    Cellulitis of fourth toe, left    Hammer toe of left foot    Toenail deformity      The patient is here today with 4 days of pain swelling and redness of his left 4th and 5th toes, he appears to have developed some cellulitis, possibly posttraumatic although he does not remember a specific trauma  I will start him on Keflex 500 mg 4 times daily and Bactroban as well  I would like for him to follow up with the podiatrist as he does have some toenail deformity and he also has for hammer toes on that foot, I wonder if foot where might be a contributing factor for the patient  If he is getting worse, developing fevers or chills, or the redness is spreading considerably I would like to see him back in the office immediately for re-evaluation and we may need to consider imaging studies as there is a small risk of developing osteomyelitis from cellulitic injuries of the foot  I encouraged the patient to examine his feet daily, and keep them covered and wear shoes while he is able to tolerate them  Subjective:      Patient ID: Magdi Cook is a 80 y o  male  59-year-old patient who is here today complaining of 4 days of pain swelling and redness of his left 4th and 5th digits  He does not believe that he had any injury to this area but it is red, swollen, slightly warm  He has some scabbing over it and his family member who is here with him reports that there was some blood  He does walk with a walker  He has hammertoe on that side, he wonders if this might contribute because he finds that she use are not generally comfortable  He has no systemic signs of infection, no fevers or chills, no chest pain or shortness of breath that is new or changing  He thinks the redness is getting a little bit worse, he finds the swelling is getting a little bit worse, today he is wearing open-toed sandals to try to accommodate for this    He does not regularly see Podiatry but he does have some toenail deformities as well  The following portions of the patient's history were reviewed and updated as appropriate: allergies, current medications, past family history, past medical history, past social history, past surgical history and problem list     Review of Systems   Constitutional: Positive for activity change  Negative for appetite change, chills, diaphoresis, fatigue and fever  Respiratory: Negative for cough, shortness of breath and wheezing  Cardiovascular: Negative for chest pain and palpitations  Musculoskeletal: Positive for arthralgias and joint swelling  Skin: Positive for color change and wound  Hematological: Negative for adenopathy  Bruises/bleeds easily  All other systems reviewed and are negative  Objective:      /78   Pulse 72   Temp (!) 97 °F (36 1 °C)   Resp 20   Ht 5' 6" (1 676 m)   Wt 58 3 kg (128 lb 9 6 oz)   BMI 20 76 kg/m²          Physical Exam   Constitutional: He is oriented to person, place, and time  Chronically ill, appears stated age, no acute distress and nontoxic appearing   HENT:   Head: Normocephalic and atraumatic  Eyes: Conjunctivae are normal  No scleral icterus  Cardiovascular: Normal heart sounds  Pulmonary/Chest: Effort normal and breath sounds normal    Abdominal: Bowel sounds are normal  He exhibits no distension  Musculoskeletal:   Left 4th toe with scabbing on the IP, erythema and diffusely swollen with some redness spreading up towards the foot  Mild erythema and toenail deformity with scabbing on the left 5th toe  Mild warmth with palpation, swelling is nonpitting  No streaking  Ambulating with rolling walker   Neurological: He is alert and oriented to person, place, and time  PD, tremor noted at baseline   Skin: Skin is warm and dry  [facility]   Psychiatric: He has a normal mood and affect   His behavior is normal

## 2018-05-21 NOTE — PATIENT INSTRUCTIONS
Cellulitis of fourth toe, left    Hammer toe of left foot    Toenail deformity      The patient is here today with 4 days of pain swelling and redness of his left 4th and 5th toes, he appears to have developed some cellulitis, possibly posttraumatic although he does not remember a specific trauma  I will start him on Keflex 500 mg 4 times daily and Bactroban as well  I would like for him to follow up with the podiatrist as he does have some toenail deformity and he also has for hammer toes on that foot, I wonder if foot where might be a contributing factor for the patient  If he is getting worse, developing fevers or chills, or the redness is spreading considerably I would like to see him back in the office immediately for re-evaluation and we may need to consider imaging studies as there is a small risk of developing osteomyelitis from cellulitic injuries of the foot  I encouraged the patient to examine his feet daily, and keep them covered and wear shoes while he is able to tolerate them

## 2018-06-26 ENCOUNTER — OFFICE VISIT (OUTPATIENT)
Dept: NEUROLOGY | Facility: CLINIC | Age: 83
End: 2018-06-26
Payer: MEDICARE

## 2018-06-26 VITALS
BODY MASS INDEX: 18.96 KG/M2 | HEIGHT: 69 IN | SYSTOLIC BLOOD PRESSURE: 146 MMHG | WEIGHT: 128 LBS | DIASTOLIC BLOOD PRESSURE: 67 MMHG | RESPIRATION RATE: 16 BRPM | HEART RATE: 66 BPM

## 2018-06-26 DIAGNOSIS — G20 PARKINSON'S DISEASE (HCC): Primary | ICD-10-CM

## 2018-06-26 DIAGNOSIS — G31.84 MCI (MILD COGNITIVE IMPAIRMENT) WITH MEMORY LOSS: ICD-10-CM

## 2018-06-26 PROCEDURE — 99213 OFFICE O/P EST LOW 20 MIN: CPT | Performed by: PSYCHIATRY & NEUROLOGY

## 2018-06-26 RX ORDER — LORAZEPAM 0.5 MG/1
TABLET ORAL 3 TIMES DAILY
COMMUNITY
Start: 2014-02-14

## 2018-06-26 RX ORDER — LEVOTHYROXINE SODIUM 13 UG/1
CAPSULE ORAL
COMMUNITY
Start: 2014-02-14 | End: 2018-06-26

## 2018-06-26 RX ORDER — LAMOTRIGINE 200 MG/1
TABLET ORAL EVERY 12 HOURS
COMMUNITY
Start: 2014-02-14 | End: 2018-06-26

## 2018-06-26 RX ORDER — TAMSULOSIN HYDROCHLORIDE 0.4 MG/1
CAPSULE ORAL EVERY 24 HOURS
COMMUNITY
Start: 2014-02-14 | End: 2018-06-26

## 2018-06-26 RX ORDER — FESOTERODINE FUMARATE 4 MG/1
8 TABLET, EXTENDED RELEASE ORAL
COMMUNITY
Start: 2014-02-14 | End: 2018-06-26

## 2018-06-26 RX ORDER — MIDODRINE HYDROCHLORIDE 2.5 MG/1
TABLET ORAL 3 TIMES DAILY
COMMUNITY
Start: 2014-02-14

## 2018-06-26 RX ORDER — RIVASTIGMINE TARTRATE 3 MG/1
CAPSULE ORAL EVERY 12 HOURS
COMMUNITY
Start: 2017-07-05 | End: 2018-06-26

## 2018-06-26 RX ORDER — CARBIDOPA/LEVODOPA 25MG-250MG
TABLET ORAL
COMMUNITY
Start: 2018-05-31 | End: 2018-06-26

## 2018-06-26 RX ORDER — PANTOPRAZOLE SODIUM 40 MG/1
TABLET, DELAYED RELEASE ORAL EVERY 24 HOURS
COMMUNITY
Start: 2014-02-14 | End: 2018-06-26

## 2018-06-26 RX ORDER — SIMVASTATIN 20 MG
TABLET ORAL EVERY 24 HOURS
COMMUNITY
Start: 2014-02-14 | End: 2018-06-26

## 2018-06-26 RX ORDER — ESCITALOPRAM OXALATE 20 MG/1
TABLET ORAL EVERY 24 HOURS
COMMUNITY
Start: 2014-02-14 | End: 2018-06-26

## 2018-06-26 NOTE — LETTER
June 26, 2018     Marisabel Grimes DO  [address] [facility]  [address] [place] [address]    Patient: Jose Morin   YOB: 1930   Date of Visit: 6/26/2018       Dear Dr Munguia: Thank you for referring Lyssa Villar to me for evaluation  Below are my notes for this consultation  If you have questions, please do not hesitate to call me  I look forward to following your patient along with you  Sincerely,        Son Marie MD        CC: No Recipients  Son Marie MD  6/26/2018 12:29 PM  Sign at close encounter  Patient is here today for a follow up for his Parkinsons    Patient ID: Jose Morin is a 80 y o  male  Assessment/Plan:    Parkinson's disease ([address] )  Presently doing very well on his current carbidopa/levodopa dosing schedule  To continue carbidopa/levodopa 25/251 p o  q i d  (given at 6:00 a m , 10:00 a m , 2:00 p m  and 6:00 p m  daily)  MCI (mild cognitive impairment) with memory loss  Both historically and by testing with no significant decline since his last appointment here some 6 months ago  Again, the history and testing suggest that his memory issues are more subcortical in origin and represent retrieval issues rather than problems with memory formation  Likely represents a combination of the FX from Parkinson's disease and a small vessel cerebrovascular component  He will follow up in 6 months  Subjective:    HPI  Patient, now 80years of age, presents today with his wife  He is followed for his Parkinson's disease, along with some associated symptomatic cognitive issues  His Parkinson's disease dates back over a decade symptomatic  He has, however, done fairly well as of late on his carbidopa/levodopa schedule of 25/101 q i d  taken at 4:00 a m  intervals  He has had some associated tremor but no significant issues with tremor  He has had no evolving hallucinatory difficulties    He has had no falls and utilizes a walker for ambulatory security  With regard to his memory and cognitive issues, he has, according to his wife, not demonstrated any further significant decline since last seen  His memory issues are felt to represent more a problem with retrieval of that information, as he frequently in time or prompts  His problem is felt to reflect the affects of longstanding Parkinson's disease with additional contribution from small vessel cerebrovascular disease  Patient no longer operating a motor vehicle nor does he have an active 's license  Recent blood work, done elsewhere, reviewed  CBC with hemoglobin 8 6, hematocrit 25 6, white count 4 5 and platelet count 372  Creatinine compromised to 2 04 with a calculated GFR of 28      Past Medical History:   Diagnosis Date    Arthritis     Benign localized hyperplasia of prostate with urinary obstruction and lower urinary tract symptoms     last assessed: 7/24/2017    BPH (benign prostatic hyperplasia)     Cardiac disease     Cardiac disorder     last assessed: 7/24/2017    Depression     last assessed: 7/24/2017    External hemorrhoids     Fracture of radius, proximal, right, closed     questionable 8/15 resolved w/out tx; last assessed: 8/20/2015    Frequent urination     Gastroesophageal reflux disease     last assessed: 5/21/2013    Gout     Heart disease     Hemorrhoids     last assessed: 7/24/2017    Hx of CABG     Hyperlipidemia     Nocturia     Osteoarthritis     last assessed: 7/24/2017    Parkinson disease ([address] )     last assessed: 7/24/2017    SOB (shortness of breath)     Squamous cell carcinoma     Staph infection     Urgency of urination      Past Surgical History:   Procedure Laterality Date    CORONARY ARTERY BYPASS GRAFT      x 3    FOOT SURGERY Left 2006    FOOT SURGERY      repair    HERNIA REPAIR      KNEE SURGERY      REPLACEMENT TOTAL KNEE Left     REPLACEMENT TOTAL KNEE BILATERAL      L- 2003, R- 2006    TONSILLECTOMY Social History     Social History    Marital status: /Civil Union     Spouse name: N/A    Number of children: N/A    Years of education: N/A     Social History Main Topics    Smoking status: Former Smoker     Types: Cigarettes     Quit date: 1972    Smokeless tobacco: Never Used    Alcohol use Yes      Comment: stopped drinking alcohol ( as per allscripts)    Drug use: No    Sexual activity: Not Asked     Other Topics Concern    None     Social History Narrative    None     Family History   Problem Relation Age of Onset    Lung cancer Mother     Heart disease Father      Allergies   Allergen Reactions    Colchicine      Reaction Date: 26Mar2012;        Current Outpatient Prescriptions:     aspirin (ECOTRIN LOW STRENGTH) 81 mg EC tablet, Take 81 mg by mouth daily, Disp: , Rfl:     carbidopa-levodopa (SINEMET)  mg per tablet, Take 1 tablet by mouth 4 (four) times a day , Disp: , Rfl:     Cholecalciferol (VITAMIN D PO), Take 1,000 mg by mouth daily with lunch , Disp: , Rfl:     escitalopram (LEXAPRO) 10 mg tablet, Take 2 tablets by mouth daily, Disp: , Rfl:     Fesoterodine Fumarate ER 4 MG TB24, Take 1 tablet (4 mg total) by mouth daily (Patient taking differently: Take 8 mg by mouth daily  ), Disp: 90 tablet, Rfl: 3    lamoTRIgine (LaMICtal) 200 MG tablet, TK 1 T PO QD, Disp: , Rfl: 2    LORazepam (ATIVAN) 0 5 mg tablet, 3 (three) times a day, Disp: , Rfl:     midodrine (PROAMATINE) 2 5 mg tablet, 3 (three) times a day, Disp: , Rfl:     MULTIPLE VITAMIN PO, Take 1 tablet by mouth, Disp: , Rfl:     mupirocin (BACTROBAN) 2 % ointment, Apply topically 3 (three) times a day, Disp: 22 g, Rfl: 0    pantoprazole (PROTONIX) 40 mg tablet, Take 1 tablet by mouth every morning , Disp: , Rfl:     rivastigmine (EXELON) 3 mg capsule, Take 3 mg by mouth 2 (two) times a day , Disp: , Rfl:     simvastatin (ZOCOR) 20 mg tablet, Take 20 mg by mouth daily with dinner , Disp: , Rfl:    tamsulosin (FLOMAX) 0 4 mg, TK ONE C PO QHS, Disp: , Rfl: 3    levothyroxine 75 mcg tablet, 1 tablet daily, Disp: , Rfl: 3      Objective:    Blood pressure 146/67, pulse 66, resp  rate 16, height 5' 9" (1 753 m), weight 58 1 kg (128 lb)  Physical Exam  Lungs clear to auscultation  Rhythm regular with systolic murmur appreciated  Neurological Exam  Alert with a somewhat remote affect  Answered correctly when asked her name, location and year  Two of 3 simple object recall  No issues with naming, repetition or the ability to accurately follow a sequential request   For comparative purposes given a 30 point MMSE, scoring today 27 which is a 2 point improvement over his score of 6 months ago  Or a frontal assessment battery his score today was 8, suggesting significant subcortical issues, essentially unchanged from his result on past testing  From the standpoint of his Parkinson's, he was examined 2 hr following his last carbidopa/levodopa dose  Scattered tremor present and of mild degree  Rest tone normal bilaterally  Modest reduction in facial and general animation  Arose from a seated position unassisted and with his walker initiated gait without any significant hesitancy  ROS:    Review of Systems   Constitutional: Positive for appetite change and fever  HENT: Positive for hearing loss and tinnitus  Negative for trouble swallowing and voice change  Eyes: Negative  Negative for photophobia and pain  Respiratory: Positive for shortness of breath  Cardiovascular: Negative  Negative for palpitations  Gastrointestinal: Positive for constipation and diarrhea  Negative for nausea and vomiting  Endocrine: Negative  Negative for cold intolerance and heat intolerance  Genitourinary: Positive for frequency and urgency  Negative for dysuria  Musculoskeletal: Positive for neck pain and neck stiffness  Negative for myalgias  Skin: Negative  Negative for rash     Allergic/Immunologic: Negative  Neurological: Positive for tremors, speech difficulty, weakness (at times) and light-headedness  Negative for dizziness, seizures, syncope, facial asymmetry, numbness and headaches  Hematological: Bruises/bleeds easily  Psychiatric/Behavioral: Positive for confusion and sleep disturbance  Negative for hallucinations  The patient is nervous/anxious          I personally reviewed the ROS that was entered by the medical assistant

## 2018-06-26 NOTE — ASSESSMENT & PLAN NOTE
Both historically and by testing with no significant decline since his last appointment here some 6 months ago  Again, the history and testing suggest that his memory issues are more subcortical in origin and represent retrieval issues rather than problems with memory formation  Likely represents a combination of the FX from Parkinson's disease and a small vessel cerebrovascular component

## 2018-06-26 NOTE — PROGRESS NOTES
Patient is here today for a follow up for his Parkinsons    Patient ID: Magdi Cook is a 80 y o  male  Assessment/Plan:    Parkinson's disease ([address] )  Presently doing very well on his current carbidopa/levodopa dosing schedule  To continue carbidopa/levodopa 25/251 p o  q i d  (given at 6:00 a m , 10:00 a m , 2:00 p m  and 6:00 p m  daily)  MCI (mild cognitive impairment) with memory loss  Both historically and by testing with no significant decline since his last appointment here some 6 months ago  Again, the history and testing suggest that his memory issues are more subcortical in origin and represent retrieval issues rather than problems with memory formation  Likely represents a combination of the FX from Parkinson's disease and a small vessel cerebrovascular component  He will follow up in 6 months  Subjective:    HPI  Patient, now 80years of age, presents today with his wife  He is followed for his Parkinson's disease, along with some associated symptomatic cognitive issues  His Parkinson's disease dates back over a decade symptomatic  He has, however, done fairly well as of late on his carbidopa/levodopa schedule of 25/101 q i d  taken at 4:00 a m  intervals  He has had some associated tremor but no significant issues with tremor  He has had no evolving hallucinatory difficulties  He has had no falls and utilizes a walker for ambulatory security  With regard to his memory and cognitive issues, he has, according to his wife, not demonstrated any further significant decline since last seen  His memory issues are felt to represent more a problem with retrieval of that information, as he frequently in time or prompts  His problem is felt to reflect the affects of longstanding Parkinson's disease with additional contribution from small vessel cerebrovascular disease  Patient no longer operating a motor vehicle nor does he have an active 's license      Recent blood work, done elsewhere, reviewed  CBC with hemoglobin 8 6, hematocrit 25 6, white count 4 5 and platelet count 218  Creatinine compromised to 2 04 with a calculated GFR of 28      Past Medical History:   Diagnosis Date    Arthritis     Benign localized hyperplasia of prostate with urinary obstruction and lower urinary tract symptoms     last assessed: 7/24/2017    BPH (benign prostatic hyperplasia)     Cardiac disease     Cardiac disorder     last assessed: 7/24/2017    Depression     last assessed: 7/24/2017    External hemorrhoids     Fracture of radius, proximal, right, closed     questionable 8/15 resolved w/out tx; last assessed: 8/20/2015    Frequent urination     Gastroesophageal reflux disease     last assessed: 5/21/2013    Gout     Heart disease     Hemorrhoids     last assessed: 7/24/2017    Hx of CABG     Hyperlipidemia     Nocturia     Osteoarthritis     last assessed: 7/24/2017    Parkinson disease ([address] )     last assessed: 7/24/2017    SOB (shortness of breath)     Squamous cell carcinoma     Staph infection     Urgency of urination      Past Surgical History:   Procedure Laterality Date    CORONARY ARTERY BYPASS GRAFT      x 3    FOOT SURGERY Left 2006    FOOT SURGERY      repair    HERNIA REPAIR      KNEE SURGERY      REPLACEMENT TOTAL KNEE Left     REPLACEMENT TOTAL KNEE BILATERAL      L- 2003, R- 2006    TONSILLECTOMY       Social History     Social History    Marital status: /Civil Union     Spouse name: N/A    Number of children: N/A    Years of education: N/A     Social History Main Topics    Smoking status: Former Smoker     Types: Cigarettes     Quit date: 1972    Smokeless tobacco: Never Used    Alcohol use Yes      Comment: stopped drinking alcohol ( as per allscripts)    Drug use: No    Sexual activity: Not Asked     Other Topics Concern    None     Social History Narrative    None     Family History   Problem Relation Age of Onset    Lung cancer Mother     Heart disease Father      Allergies   Allergen Reactions    Colchicine      Reaction Date: 26Mar2012;        Current Outpatient Prescriptions:     aspirin (ECOTRIN LOW STRENGTH) 81 mg EC tablet, Take 81 mg by mouth daily, Disp: , Rfl:     carbidopa-levodopa (SINEMET)  mg per tablet, Take 1 tablet by mouth 4 (four) times a day , Disp: , Rfl:     Cholecalciferol (VITAMIN D PO), Take 1,000 mg by mouth daily with lunch , Disp: , Rfl:     escitalopram (LEXAPRO) 10 mg tablet, Take 2 tablets by mouth daily, Disp: , Rfl:     Fesoterodine Fumarate ER 4 MG TB24, Take 1 tablet (4 mg total) by mouth daily (Patient taking differently: Take 8 mg by mouth daily  ), Disp: 90 tablet, Rfl: 3    lamoTRIgine (LaMICtal) 200 MG tablet, TK 1 T PO QD, Disp: , Rfl: 2    LORazepam (ATIVAN) 0 5 mg tablet, 3 (three) times a day, Disp: , Rfl:     midodrine (PROAMATINE) 2 5 mg tablet, 3 (three) times a day, Disp: , Rfl:     MULTIPLE VITAMIN PO, Take 1 tablet by mouth, Disp: , Rfl:     mupirocin (BACTROBAN) 2 % ointment, Apply topically 3 (three) times a day, Disp: 22 g, Rfl: 0    pantoprazole (PROTONIX) 40 mg tablet, Take 1 tablet by mouth every morning , Disp: , Rfl:     rivastigmine (EXELON) 3 mg capsule, Take 3 mg by mouth 2 (two) times a day , Disp: , Rfl:     simvastatin (ZOCOR) 20 mg tablet, Take 20 mg by mouth daily with dinner , Disp: , Rfl:     tamsulosin (FLOMAX) 0 4 mg, TK ONE C PO QHS, Disp: , Rfl: 3    levothyroxine 75 mcg tablet, 1 tablet daily, Disp: , Rfl: 3      Objective:    Blood pressure 146/67, pulse 66, resp  rate 16, height 5' 9" (1 753 m), weight 58 1 kg (128 lb)  Physical Exam  Lungs clear to auscultation  Rhythm regular with systolic murmur appreciated  Neurological Exam  Alert with a somewhat remote affect  Answered correctly when asked her name, location and year  Two of 3 simple object recall    No issues with naming, repetition or the ability to accurately follow a sequential request   For comparative purposes given a 30 point MMSE, scoring today 27 which is a 2 point improvement over his score of 6 months ago  Or a frontal assessment battery his score today was 8, suggesting significant subcortical issues, essentially unchanged from his result on past testing  From the standpoint of his Parkinson's, he was examined 2 hr following his last carbidopa/levodopa dose  Scattered tremor present and of mild degree  Rest tone normal bilaterally  Modest reduction in facial and general animation  Arose from a seated position unassisted and with his walker initiated gait without any significant hesitancy  ROS:    Review of Systems   Constitutional: Positive for appetite change and fever  HENT: Positive for hearing loss and tinnitus  Negative for trouble swallowing and voice change  Eyes: Negative  Negative for photophobia and pain  Respiratory: Positive for shortness of breath  Cardiovascular: Negative  Negative for palpitations  Gastrointestinal: Positive for constipation and diarrhea  Negative for nausea and vomiting  Endocrine: Negative  Negative for cold intolerance and heat intolerance  Genitourinary: Positive for frequency and urgency  Negative for dysuria  Musculoskeletal: Positive for neck pain and neck stiffness  Negative for myalgias  Skin: Negative  Negative for rash  Allergic/Immunologic: Negative  Neurological: Positive for tremors, speech difficulty, weakness (at times) and light-headedness  Negative for dizziness, seizures, syncope, facial asymmetry, numbness and headaches  Hematological: Bruises/bleeds easily  Psychiatric/Behavioral: Positive for confusion and sleep disturbance  Negative for hallucinations  The patient is nervous/anxious          I personally reviewed the ROS that was entered by the medical assistant

## 2018-06-26 NOTE — ASSESSMENT & PLAN NOTE
Presently doing very well on his current carbidopa/levodopa dosing schedule  To continue carbidopa/levodopa 25/251 p o  q i d  (given at 6:00 a m , 10:00 a m , 2:00 p m  and 6:00 p m  daily)

## 2018-07-13 DIAGNOSIS — K21.9 GASTROESOPHAGEAL REFLUX DISEASE WITHOUT ESOPHAGITIS: Primary | ICD-10-CM

## 2018-07-15 RX ORDER — PANTOPRAZOLE SODIUM 40 MG/1
TABLET, DELAYED RELEASE ORAL
Qty: 90 TABLET | Refills: 1 | Status: SHIPPED | OUTPATIENT
Start: 2018-07-15

## 2018-08-13 DIAGNOSIS — G31.84 MILD COGNITIVE IMPAIRMENT: Primary | ICD-10-CM

## 2018-08-14 RX ORDER — RIVASTIGMINE TARTRATE 3 MG/1
CAPSULE ORAL
Qty: 180 CAPSULE | Refills: 1 | Status: SHIPPED | OUTPATIENT
Start: 2018-08-14

## 2018-10-28 DIAGNOSIS — R39.15 BENIGN PROSTATIC HYPERPLASIA (BPH) WITH URINARY URGENCY: ICD-10-CM

## 2018-10-28 DIAGNOSIS — N40.1 BENIGN PROSTATIC HYPERPLASIA (BPH) WITH URINARY URGENCY: ICD-10-CM

## 2018-10-29 RX ORDER — FESOTERODINE FUMARATE 4 MG/1
TABLET, FILM COATED, EXTENDED RELEASE ORAL
Qty: 30 TABLET | Refills: 0 | OUTPATIENT
Start: 2018-10-29

## 2018-10-29 RX ORDER — FESOTERODINE FUMARATE 4 MG/1
4 TABLET, EXTENDED RELEASE ORAL DAILY
Qty: 90 TABLET | Refills: 1 | Status: SHIPPED | OUTPATIENT
Start: 2018-10-29

## 2018-10-29 NOTE — TELEPHONE ENCOUNTER
A [employer] Refill Request for Toviaz 4mg was received from [facility]/pharmacy #[id number] Patient was last seen in February, 2018; continuation of the medication was approved at that time    Script for same, 90 day supply with 3 refills was queued and forwarded to Dr Amber Estes for approval

## 2018-11-03 ENCOUNTER — DOCUMENTATION (OUTPATIENT)
Dept: FAMILY MEDICINE CLINIC | Facility: CLINIC | Age: 83
End: 2018-11-03

## 2018-11-03 NOTE — PROGRESS NOTES
rcvd call from  today -   He passed away in home-  Appears cardiac event -   Will sign death cert when back in office 2 days

## 2022-05-10 NOTE — TELEPHONE ENCOUNTER
Pt. Currently has a colostomy at this time, sit is clean and intact Script for Tamsulosin 0 4mg was queued and forwarded to Dr Bronson Haines for approval